# Patient Record
Sex: FEMALE | URBAN - METROPOLITAN AREA
[De-identification: names, ages, dates, MRNs, and addresses within clinical notes are randomized per-mention and may not be internally consistent; named-entity substitution may affect disease eponyms.]

---

## 2017-11-28 ENCOUNTER — APPOINTMENT (RX ONLY)
Dept: URBAN - METROPOLITAN AREA CLINIC 45 | Facility: CLINIC | Age: 77
Setting detail: DERMATOLOGY
End: 2017-11-28

## 2017-11-28 DIAGNOSIS — L20.89 OTHER ATOPIC DERMATITIS: ICD-10-CM

## 2017-11-28 DIAGNOSIS — L90.6 STRIAE ATROPHICAE: ICD-10-CM

## 2017-11-28 PROBLEM — C18.9 MALIGNANT NEOPLASM OF COLON, UNSPECIFIED: Status: ACTIVE | Noted: 2017-11-28

## 2017-11-28 PROBLEM — J45.909 UNSPECIFIED ASTHMA, UNCOMPLICATED: Status: ACTIVE | Noted: 2017-11-28

## 2017-11-28 PROBLEM — J44.9 CHRONIC OBSTRUCTIVE PULMONARY DISEASE, UNSPECIFIED: Status: ACTIVE | Noted: 2017-11-28

## 2017-11-28 PROBLEM — L85.3 XEROSIS CUTIS: Status: ACTIVE | Noted: 2017-11-28

## 2017-11-28 PROBLEM — I63.50 CEREBRAL INFARCTION DUE TO UNSPECIFIED OCCLUSION OR STENOSIS OF UNSPECIFIED CEREBRAL ARTERY: Status: ACTIVE | Noted: 2017-11-28

## 2017-11-28 PROBLEM — Z92.3 PERSONAL HISTORY OF IRRADIATION: Status: ACTIVE | Noted: 2017-11-28

## 2017-11-28 PROBLEM — Z85.3 PERSONAL HISTORY OF MALIGNANT NEOPLASM OF BREAST: Status: ACTIVE | Noted: 2017-11-28

## 2017-11-28 PROBLEM — M12.9 ARTHROPATHY, UNSPECIFIED: Status: ACTIVE | Noted: 2017-11-28

## 2017-11-28 PROBLEM — J30.1 ALLERGIC RHINITIS DUE TO POLLEN: Status: ACTIVE | Noted: 2017-11-28

## 2017-11-28 PROBLEM — L20.84 INTRINSIC (ALLERGIC) ECZEMA: Status: ACTIVE | Noted: 2017-11-28

## 2017-11-28 PROCEDURE — 99202 OFFICE O/P NEW SF 15 MIN: CPT

## 2017-11-28 PROCEDURE — ? OTHER

## 2017-11-28 PROCEDURE — ? PRESCRIPTION

## 2017-11-28 PROCEDURE — ? TREATMENT REGIMEN

## 2017-11-28 PROCEDURE — ? COUNSELING

## 2017-11-28 RX ORDER — TRIAMCINOLONE ACETONIDE 1 MG/G
1 CREAM TOPICAL BID
Qty: 1 | Refills: 2 | Status: ERX | COMMUNITY
Start: 2017-11-28

## 2017-11-28 RX ADMIN — TRIAMCINOLONE ACETONIDE 1: 1 CREAM TOPICAL at 00:00

## 2017-11-28 ASSESSMENT — LOCATION SIMPLE DESCRIPTION DERM
LOCATION SIMPLE: ABDOMEN
LOCATION SIMPLE: UPPER BACK

## 2017-11-28 ASSESSMENT — LOCATION DETAILED DESCRIPTION DERM
LOCATION DETAILED: EPIGASTRIC SKIN
LOCATION DETAILED: PERIUMBILICAL SKIN
LOCATION DETAILED: SUPERIOR THORACIC SPINE

## 2017-11-28 ASSESSMENT — LOCATION ZONE DERM: LOCATION ZONE: TRUNK

## 2017-11-28 NOTE — PROCEDURE: TREATMENT REGIMEN
Initiate Treatment: Moisturize twice daily with a ceramide containing lotion BID, triamcinolone BID PRN, Zyrtec QAM, Benadryl QHS PRN
Samples Given: Cetaphil Restoraderm, Aveeno balm, Cerave
Detail Level: Zone